# Patient Record
Sex: FEMALE | HISPANIC OR LATINO | URBAN - METROPOLITAN AREA
[De-identification: names, ages, dates, MRNs, and addresses within clinical notes are randomized per-mention and may not be internally consistent; named-entity substitution may affect disease eponyms.]

---

## 2019-04-03 ENCOUNTER — OFFICE VISIT (OUTPATIENT)
Dept: URBAN - METROPOLITAN AREA CLINIC 48 | Facility: CLINIC | Age: 79
End: 2019-04-03
Payer: COMMERCIAL

## 2019-04-03 DIAGNOSIS — E11.3293 TYPE 2 DIABETES MELLITUS W/ MILD NONPROLIFERATIVE DIABETIC RETINOPATHY W/O MACULAR EDEMA OF BILATERAL EYES: Primary | ICD-10-CM

## 2019-04-03 PROCEDURE — 92004 COMPRE OPH EXAM NEW PT 1/>: CPT | Performed by: OPTOMETRIST

## 2019-04-03 PROCEDURE — 92014 COMPRE OPH EXAM EST PT 1/>: CPT | Performed by: OPTOMETRIST

## 2019-04-03 ASSESSMENT — INTRAOCULAR PRESSURE
OS: 14
OD: 14

## 2019-04-03 NOTE — IMPRESSION/PLAN
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 Plan: Non-proliferative retinopathy found on today's examination. Relatively low risk of progression to PDR at this time. Discussed signs and symptoms of CNVM formation. Discussed importance of blood sugar, blood pressure and cholesterol control. Pt to RTC PRN with any change in visual status. Discussed retinal healing lag time from start of good A1C control for up to 2 years. Letter with results of today's examination sent to managing provider.  RTC 1 year